# Patient Record
Sex: MALE | Race: WHITE | NOT HISPANIC OR LATINO | Employment: FULL TIME | URBAN - NONMETROPOLITAN AREA
[De-identification: names, ages, dates, MRNs, and addresses within clinical notes are randomized per-mention and may not be internally consistent; named-entity substitution may affect disease eponyms.]

---

## 2023-02-04 PROBLEM — C61 PROSTATE CA (MULTI): Status: ACTIVE | Noted: 2023-02-04

## 2023-02-04 PROBLEM — F17.210 CIGARETTE NICOTINE DEPENDENCE: Status: ACTIVE | Noted: 2023-02-04

## 2023-02-04 PROBLEM — E78.5 HYPERLIPIDEMIA: Status: ACTIVE | Noted: 2023-02-04

## 2023-02-04 PROBLEM — R20.9 COLD LEFT FOOT: Status: ACTIVE | Noted: 2023-02-04

## 2023-02-04 PROBLEM — R97.20 ELEVATED PSA: Status: ACTIVE | Noted: 2023-02-04

## 2023-02-04 PROBLEM — R20.0 NUMBNESS AND TINGLING: Status: ACTIVE | Noted: 2023-02-04

## 2023-02-04 PROBLEM — M10.9 GOUT: Status: ACTIVE | Noted: 2023-02-04

## 2023-02-04 PROBLEM — K62.89 RECTAL MASS: Status: ACTIVE | Noted: 2023-02-04

## 2023-02-04 PROBLEM — E55.9 VITAMIN D DEFICIENCY: Status: ACTIVE | Noted: 2023-02-04

## 2023-02-04 PROBLEM — R20.2 NUMBNESS AND TINGLING: Status: ACTIVE | Noted: 2023-02-04

## 2023-02-04 PROBLEM — R35.1 NOCTURIA: Status: ACTIVE | Noted: 2023-02-04

## 2023-02-04 RX ORDER — ERGOCALCIFEROL 1.25 MG/1
1 CAPSULE ORAL
COMMUNITY
Start: 2022-09-23

## 2023-02-04 RX ORDER — COLCHICINE 0.6 MG/1
0.6 TABLET ORAL 2 TIMES DAILY PRN
COMMUNITY
Start: 2022-09-23 | End: 2023-05-16

## 2023-05-16 DIAGNOSIS — M10.9 GOUT, UNSPECIFIED: ICD-10-CM

## 2023-05-16 RX ORDER — COLCHICINE 0.6 MG/1
TABLET ORAL
Qty: 60 TABLET | Refills: 0 | Status: SHIPPED | OUTPATIENT
Start: 2023-05-16 | End: 2023-06-13

## 2023-05-16 NOTE — TELEPHONE ENCOUNTER
Patient does not have any insurance right now, he has just started a new job, so he is not able to make an appointment at this time.

## 2023-06-12 DIAGNOSIS — M10.9 GOUT, UNSPECIFIED: ICD-10-CM

## 2023-06-13 RX ORDER — COLCHICINE 0.6 MG/1
TABLET ORAL
Qty: 60 TABLET | Refills: 0 | Status: SHIPPED | OUTPATIENT
Start: 2023-06-13 | End: 2023-07-18

## 2023-06-14 DIAGNOSIS — E55.9 VITAMIN D DEFICIENCY: Primary | ICD-10-CM

## 2023-06-14 DIAGNOSIS — R97.20 ELEVATED PSA: ICD-10-CM

## 2023-06-14 DIAGNOSIS — E78.00 PURE HYPERCHOLESTEROLEMIA: ICD-10-CM

## 2023-06-14 NOTE — TELEPHONE ENCOUNTER
Patient scheduled in September.  
Patient was recently given a 30 days supply on 5/16/23. Pt does not have an upcoming appt scheduled at this time 
same as patient

## 2023-07-15 DIAGNOSIS — M10.9 GOUT, UNSPECIFIED: ICD-10-CM

## 2023-07-18 RX ORDER — COLCHICINE 0.6 MG/1
TABLET ORAL
Qty: 60 TABLET | Refills: 0 | Status: SHIPPED | OUTPATIENT
Start: 2023-07-18

## 2023-07-26 DIAGNOSIS — M10.9 GOUT, UNSPECIFIED: ICD-10-CM

## 2023-09-15 ENCOUNTER — APPOINTMENT (OUTPATIENT)
Dept: PRIMARY CARE | Facility: CLINIC | Age: 58
End: 2023-09-15